# Patient Record
Sex: MALE | Race: WHITE | NOT HISPANIC OR LATINO | Employment: FULL TIME | URBAN - METROPOLITAN AREA
[De-identification: names, ages, dates, MRNs, and addresses within clinical notes are randomized per-mention and may not be internally consistent; named-entity substitution may affect disease eponyms.]

---

## 2019-06-20 DIAGNOSIS — L50.1 IDIOPATHIC URTICARIA: ICD-10-CM

## 2019-06-20 LAB
ALT SERPL W P-5'-P-CCNC: 14 U/L (ref 0–50)
BASOPHILS # BLD AUTO: 0 10E9/L (ref 0–0.2)
BASOPHILS NFR BLD AUTO: 0.3 %
DEPRECATED CALCIDIOL+CALCIFEROL SERPL-MC: 16 UG/L (ref 20–75)
DIFFERENTIAL METHOD BLD: NORMAL
EOSINOPHIL # BLD AUTO: 0.4 10E9/L (ref 0–0.7)
EOSINOPHIL NFR BLD AUTO: 6.2 %
ERYTHROCYTE [DISTWIDTH] IN BLOOD BY AUTOMATED COUNT: 13.2 % (ref 10–15)
HCT VFR BLD AUTO: 45 % (ref 40–53)
HGB BLD-MCNC: 15.1 G/DL (ref 13.3–17.7)
LYMPHOCYTES # BLD AUTO: 3 10E9/L (ref 0.8–5.3)
LYMPHOCYTES NFR BLD AUTO: 47.7 %
MCH RBC QN AUTO: 28.2 PG (ref 26.5–33)
MCHC RBC AUTO-ENTMCNC: 33.6 G/DL (ref 31.5–36.5)
MCV RBC AUTO: 84 FL (ref 78–100)
MONOCYTES # BLD AUTO: 0.5 10E9/L (ref 0–1.3)
MONOCYTES NFR BLD AUTO: 8 %
NEUTROPHILS # BLD AUTO: 2.4 10E9/L (ref 1.6–8.3)
NEUTROPHILS NFR BLD AUTO: 37.8 %
PLATELET # BLD AUTO: 277 10E9/L (ref 150–450)
RBC # BLD AUTO: 5.36 10E12/L (ref 4.4–5.9)
TSH SERPL DL<=0.005 MIU/L-ACNC: 6.92 MU/L (ref 0.4–4)
WBC # BLD AUTO: 6.3 10E9/L (ref 4–11)

## 2019-06-20 PROCEDURE — 86038 ANTINUCLEAR ANTIBODIES: CPT | Performed by: ALLERGY & IMMUNOLOGY

## 2019-06-20 PROCEDURE — 84443 ASSAY THYROID STIM HORMONE: CPT | Performed by: ALLERGY & IMMUNOLOGY

## 2019-06-20 PROCEDURE — 82306 VITAMIN D 25 HYDROXY: CPT | Performed by: ALLERGY & IMMUNOLOGY

## 2019-06-20 PROCEDURE — 84460 ALANINE AMINO (ALT) (SGPT): CPT | Performed by: ALLERGY & IMMUNOLOGY

## 2019-06-20 PROCEDURE — 85025 COMPLETE CBC W/AUTO DIFF WBC: CPT | Performed by: ALLERGY & IMMUNOLOGY

## 2019-06-20 PROCEDURE — 36415 COLL VENOUS BLD VENIPUNCTURE: CPT | Performed by: ALLERGY & IMMUNOLOGY

## 2019-06-21 LAB — ANA SER QL IF: NEGATIVE

## 2021-07-29 ENCOUNTER — OFFICE VISIT (OUTPATIENT)
Dept: URGENT CARE | Facility: URGENT CARE | Age: 21
End: 2021-07-29
Payer: COMMERCIAL

## 2021-07-29 VITALS
WEIGHT: 170 LBS | DIASTOLIC BLOOD PRESSURE: 82 MMHG | HEIGHT: 74 IN | OXYGEN SATURATION: 99 % | HEART RATE: 74 BPM | SYSTOLIC BLOOD PRESSURE: 129 MMHG | BODY MASS INDEX: 21.82 KG/M2 | TEMPERATURE: 97.8 F | RESPIRATION RATE: 18 BRPM

## 2021-07-29 DIAGNOSIS — J02.0 STREPTOCOCCAL PHARYNGITIS: ICD-10-CM

## 2021-07-29 DIAGNOSIS — R53.83 OTHER FATIGUE: ICD-10-CM

## 2021-07-29 DIAGNOSIS — Z20.822 SUSPECTED COVID-19 VIRUS INFECTION: Primary | ICD-10-CM

## 2021-07-29 DIAGNOSIS — J03.90 TONSILLITIS: ICD-10-CM

## 2021-07-29 LAB
BASOPHILS # BLD AUTO: 0 10E3/UL (ref 0–0.2)
BASOPHILS NFR BLD AUTO: 0 %
DEPRECATED S PYO AG THROAT QL EIA: POSITIVE
EOSINOPHIL # BLD AUTO: 0.6 10E3/UL (ref 0–0.7)
EOSINOPHIL NFR BLD AUTO: 12 %
LYMPHOCYTES # BLD AUTO: 1.7 10E3/UL (ref 0.8–5.3)
LYMPHOCYTES NFR BLD AUTO: 36 %
MONOCYTES # BLD AUTO: 0.4 10E3/UL (ref 0–1.3)
MONOCYTES NFR BLD AUTO: 9 %
MONOCYTES NFR BLD AUTO: NEGATIVE %
NEUTROPHILS # BLD AUTO: 2 10E3/UL (ref 1.6–8.3)
NEUTROPHILS NFR BLD AUTO: 43 %
SARS-COV-2 RNA RESP QL NAA+PROBE: NEGATIVE
WBC # BLD AUTO: 4.7 10E3/UL (ref 4–11)

## 2021-07-29 PROCEDURE — 86308 HETEROPHILE ANTIBODY SCREEN: CPT | Performed by: PHYSICIAN ASSISTANT

## 2021-07-29 PROCEDURE — U0003 INFECTIOUS AGENT DETECTION BY NUCLEIC ACID (DNA OR RNA); SEVERE ACUTE RESPIRATORY SYNDROME CORONAVIRUS 2 (SARS-COV-2) (CORONAVIRUS DISEASE [COVID-19]), AMPLIFIED PROBE TECHNIQUE, MAKING USE OF HIGH THROUGHPUT TECHNOLOGIES AS DESCRIBED BY CMS-2020-01-R: HCPCS | Performed by: PHYSICIAN ASSISTANT

## 2021-07-29 PROCEDURE — 85004 AUTOMATED DIFF WBC COUNT: CPT | Performed by: PHYSICIAN ASSISTANT

## 2021-07-29 PROCEDURE — 36415 COLL VENOUS BLD VENIPUNCTURE: CPT | Performed by: PHYSICIAN ASSISTANT

## 2021-07-29 PROCEDURE — 99204 OFFICE O/P NEW MOD 45 MIN: CPT | Performed by: PHYSICIAN ASSISTANT

## 2021-07-29 PROCEDURE — U0005 INFEC AGEN DETEC AMPLI PROBE: HCPCS | Performed by: PHYSICIAN ASSISTANT

## 2021-07-29 PROCEDURE — 85048 AUTOMATED LEUKOCYTE COUNT: CPT | Performed by: PHYSICIAN ASSISTANT

## 2021-07-29 RX ORDER — PENICILLIN V POTASSIUM 500 MG/1
500 TABLET, FILM COATED ORAL 2 TIMES DAILY
Qty: 20 TABLET | Refills: 0 | Status: SHIPPED | OUTPATIENT
Start: 2021-07-29 | End: 2021-08-08

## 2021-07-29 ASSESSMENT — MIFFLIN-ST. JEOR: SCORE: 1845.86

## 2021-07-29 NOTE — PATIENT INSTRUCTIONS
Follow up immediately with severe headache, chest pain, or shortness of breath    Rest, isolate for 10 days, hydrate, follow up if worsening or new symptoms  Household members to isolate until test results, if positive isolate for 2 weeks and follow up for testing if symptoms occur         Patient Education     Coronavirus Disease 2019 (COVID-19): Caring for Yourself or Others   If you or a household member have symptoms of COVID-19, follow the guidelines below. This will help you manage symptoms and keep the virus from spreading.  If you have symptoms of COVID-19    Stay home and contact your care team. They will tell you what to do.    Don t panic. Keep in mind that other illnesses can cause similar symptoms.    Stay away from work, school, and public places.    Limit physical contact with others in your home. Limit visitors. No kissing.  Clean surfaces you touch with disinfectant.  If you need to cough or sneeze, do it into a tissue. Then throw the tissue into the trash. If you don't have tissues, cough or sneeze into the bend of your elbow.  Don t share food or personal items with people in your household. This includes items like eating and drinking utensils, towels, and bedding.  Wear a cloth face mask around other people. During a public health emergency, medical face masks may be reserved for healthcare workers. You may need to make a cloth face mask of your own. You can do this using a bandana, T-shirt, or other cloth. The CDC has instructions on how to make a face mask. Wear the mask so that it covers both your nose and mouth.  If you need to go to a hospital or clinic, call ahead to let them know. Expect the care team to wear masks, gowns, gloves, and eye protection. You may need to come to a different entrance or wait in a separate room.  Follow all instructions from your care team.    If you ve been diagnosed with COVID-19    Stay home and away from others, including other people in your home. (This is  called self-isolation.) Don t leave home unless you need to get medical care. Don t go to work, school, or public places. Don t use buses, taxis, or other public transportation.    Follow all instructions from your care team.    If you need to go to a hospital or clinic, call ahead to let them know. Expect the care team to wear masks, gowns, gloves, and eye protection. You may need to come to a different entrance or wait in a separate room.    Wear a face mask over your nose and mouth. This is to protect others from your germs. If you can t wear a mask, your caregivers should wear one. You may need to make your own mask using a bandana, T-shirt, or other cloth. See the CDC s instructions on how to make a face mask.    Have no contact with pets and other animals.    Don t share food or personal items with people in your household. This includes items like eating and drinking utensils, towels, and bedding.    If you need to cough or sneeze, do it into a tissue. Then throw the tissue into the trash. If you don't have tissues, cough or sneeze into the bend of your elbow.    Wash your hands often.    Self-care at home   At this time, there is no medicine approved to prevent or treat COVID-19. The FDA has approved an antiviral medicine (called remdesivir) for people being treated in the hospital. This is for people 12 years and older who weigh more than about 88 pounds (40 kgs). In certain cases, it may also be used for people younger than 12 years or who weigh less than about 88 pounds (40 kgs)..  Currently, treatment is mostly aimed at helping your body while it fights the virus.    Getting extra rest.    Drink extra fluids 6 to 8 glasses of liquids each day), unless a doctor has told you not to. Ask your care team which fluids are best for you. Avoid fluids that contain caffeine or alcohol.    Taking over-the-counter (OTC) medicine to reduce pain and fever. Your care team will tell you which medicine to use.  If you ve  been in the hospital for COVID-19, follow your care team s instructions. They will tell you when to stop self-isolation. They may also have you change positions to help your breathing (such as lying on your belly).  If a test showed that you have COVID-19, you may be asked to donate plasma after you ve recovered. (This is called COVID-19 convalescent plasma donation.) The plasma may contain antibodies to help fight the virus in others. Experts don't know if the plasma will work well as a treatment. Research continues, and the FDA has approved it for emergency use in certain people with serious or life-threatening COVID-19. For more information, talk to your care team.  Caring for a sick person     Follow all instructions from the care team.    Wash your hands often.    Wear protective clothing as advised.    Make sure the sick person wears a mask. If they can't wear a mask, don t stay in the same room with them. If you must be in the same room, wear a face mask. Make sure the mask covers both the nose and mouth.    Keep track of the sick person s symptoms.    Clean surfaces often with disinfectant. This includes phones, kitchen counters, fridge door handles, bathroom surfaces, and others.    Don t let anyone share household items with the sick person. This includes eating and drinking tools, towels, sheets, or blankets.    Clean fabrics and laundry well.    Keep other people and pets away from the sick person.    When you can stop self-isolation  When you are sick with COVID-19, you should stay away from other people. This is called self-isolation. The rules for ending self-isolation depend on your health in general.  If you are normally healthy:  You can stop self-isolation when all 3 of these are true:    You ve had no fever--and no medicine that reduces fever--for at least 24 hours. And     Your symptoms are better, such as cough or trouble breathing. And     At least 10 days have passed since your symptoms first  started.  Talk with your care team before you leave home. They may tell you it s okay to leave, or they may give you different advice. You do not need to be re-tested.  If you have a weak immune system, or you ve had severe COVID-19:  Follow your care team s instructions. You may be asked to self-isolate for 10 days to 20 days after your symptoms first started. Your care team may want to re-test you for COVID-19.  Note: If you re being treated for cancer, have an immune disorder (such as HIV), or have had a transplant (organ or bone marrow), you may have a weak immune system.  If you've already had COVID-19 once:  If you had the virus over 3 months ago, and you ve been exposed again, treat it like you've never had COVID-19. Stay home, limit your contact with others, call your care team, and watch for symptoms.  If it s been less than 3 months since you had the virus, you re symptom-free, and you ve been exposed again: You don t need to self-isolate or be re-tested. But if you develop new symptoms that can t be linked to another illness, please self-isolate and contact your care team.  When you return to public settings  When you are well enough to go outside your home, follow the CDC s guidance on cloth face masks.    Anyone over age 2 should wear a face mask in public, especially when it's hard to socially distance. This includes public transit, public protests and marches, and crowded stores, bars, and restaurants.    Face masks are most likely to reduce the spread of COVID-19 when they are widely used by people who are out in the public.  Certain people should not wear a face covering. These include:    Children under 2 years old    Anyone with a health, developmental, or mental health condition that can be made worse by wearing a mask    Anyone who is unconscious or unable to remove the face covering without help. See the CDC's guidance on who should not wear a face mask.  When to call your care team  Call your  care team right away if a sick person has any of these:    Trouble breathing    Pain or pressure in chest  If a sick person has any of these, call 911:    Trouble breathing that gets worse    Pain or pressure in chest that gets worse    Blue tint to lips or face    Fast or irregular heartbeat    Confusion or trouble waking    Fainting or loss of consciousness    Coughing up blood  Going home from the hospital   If you have COVID-19 and were recently in the hospital:    Follow the instructions above for self-care and isolation.    Follow the hospital care team s instructions.    Ask questions if anything is unclear to you. Write down answers so you remember them.  Date last modified: 11/23/2020  [employer] last reviewed this educational content on 4/1/2020  This information has been modified by your health care provider with permission from the publisher.    2236-9293 [employer]. [address] [place] [id number]. All rights reserved. This information is not intended as a substitute for professional medical care. Always follow your healthcare professional's instructions.             Patient Education     Pharyngitis: Strep (Confirmed)    You have had a positive test for strep throat. Strep throat is a contagious illness. It's spread by coughing, kissing, sharing glasses or eating utensils, or by touching others after touching your mouth or nose. Symptoms include throat pain that is worse with swallowing, aching all over, headache, swollen lymph nodes at the front of the neck, and red swollen tonsils sometimes with white patches and fever. It's treated with antibiotic medicine. This should help you start to feel better in 1 to 2 days.   Home care    Rest at home. Drink plenty of fluids so you won't get dehydrated.    No work or school for the first 2 days of taking the antibiotics. You can then return to school or work if you are feeling better, have been taking the antibiotic for at least 24  hours and don't have a fever.     Take antibiotic medicine for the full 10 days, even if you feel better. This is very important to ensure the infection is treated completely. It's also important to prevent medicine-resistant germs from developing. If you were given an antibiotic shot, you don't need any more antibiotics.    You may use acetaminophen or ibuprofen to control pain or fever, unless another medicine was prescribed for this. Talk with your healthcare provider before taking these medicines if you have chronic liver or kidney disease or if you have had a stomach ulcer or gastrointestinal bleeding.    Throat lozenges or sprays help reduce pain. Gargling with warm saltwater will also reduce throat pain. Dissolve 1/2 teaspoon of salt in 1 glass of warm water. This may be useful just before meals.     Soft foods and cool or warm fluids are best. Don't eat salty or spicy foods.    Follow-up care  Follow up with your healthcare provider or our staff if you don't get better over the next week.   When to get medical advice  Call your healthcare provider right away or get immediate medical care if any of these occur:     Fever of 100.4 F (38 C) or higher, or as directed by your healthcare provider    New or worsening ear pain, sinus pain, or headache    Painful lumps in the back of neck    Stiff neck    Lymph nodes getting larger or becoming soft in the middle    You have trouble swallowing liquids or you can't open your mouth wide because of throat pain    Signs of dehydration. These include very dark urine or no urine, sunken eyes, and dizziness.    Noisy breathing    Muffled voice    Rash  Call 911  Call 911right away if you:     Have trouble breathing    Can't swallow or talk    Prevention  Here are steps you can take to help prevent an infection:     Wash your hands often with soap and clean, running water for at least 20 seconds.    Don t have close contact with people who have sore throats, colds, or other  upper respiratory infections.    Don t smoke, and stay away from secondhand smoke.  [employer] last reviewed this educational content on 3/1/2020    8093-2868 [employer]. All rights reserved. This information is not intended as a substitute for professional medical care. Always follow your healthcare professional's instructions.

## 2021-07-29 NOTE — PROGRESS NOTES
"SUBJECTIVE:   Figueroa Del Cid is a 20 year old male presenting with a chief complaint of modest fatigue for 2 weeks.  Swollen tonsils.  No sore throat, no fever.        No past medical history on file.  No current outpatient medications on file.     Social History     Tobacco Use     Smoking status: Never Smoker     Smokeless tobacco: Never Used   Substance Use Topics     Alcohol use: Not on file       ROS:  10 point ROS negative except as listed above      OBJECTIVE:  /82   Pulse 74   Temp 97.8  F (36.6  C)   Resp 18   Ht 1.88 m (6' 2\")   Wt 77.1 kg (170 lb)   SpO2 99%   BMI 21.83 kg/m    GENERAL APPEARANCE: healthy, alert and no distress  EYES: EOMI,  PERRL, conjunctiva clear  HENT: ear canals and TM's normal.  Nose and mouth without ulcers, erythema or lesions  NECK: supple, nontender, no lymphadenopathy  RESP: lungs clear to auscultation - no rales, rhonchi or wheezes  CV: regular rates and rhythm, normal S1 S2, no murmur noted  ABDOMEN:  soft, nontender, no HSM or masses and bowel sounds normal  NEURO: Normal strength and tone, sensory exam grossly normal,  normal speech and mentation  SKIN: no suspicious lesions or rashes  Results for orders placed or performed in visit on 07/29/21   SARS-COV2 (COVID-19) Virus RT-PCR     Status: Normal    Specimen: Nasopharyngeal; Swab   Result Value Ref Range    SARS CoV2 PCR Negative Negative    Narrative    Testing was performed using the Aptima SARS-CoV-2 Assay on the  [employer] Instrument System. Additional information about this  Emergency Use Authorization (EUA) assay can be found via the Lab  Guide. This test should be ordered for the detection of SARS-CoV-2 in  individuals who meet SARS-CoV-2 clinical and/or epidemiological  criteria. Test performance is unknown in asymptomatic patients. This  test is for in vitro diagnostic use under the FDA EUA for  laboratories certified under CLIA to perform high complexity testing.  This test has not been FDA " cleared or approved. A negative result  does not rule out the presence of PCR inhibitors in the specimen or  target RNA in concentration below the limit of detection for the  assay. The possibility of a false negative should be considered if  the patient's recent exposure or clinical presentation suggests  COVID-19. This test was validated by the [facility]. This laboratory is certified under  the Clinical Laboratory Improvement Amendments of 1988 (CLIA-88) as  qualified to perform high complexity laboratory testing.   Mononucleosis screen     Status: Normal   Result Value Ref Range    Mononucleosis Screen Negative Negative   WBC and Differential     Status: None   Result Value Ref Range    WBC Count 4.7 4.0 - 11.0 10e3/uL    % Neutrophils 43 %    % Lymphocytes 36 %    % Monocytes 9 %    % Eosinophils 12 %    % Basophils 0 %    Absolute Neutrophils 2.0 1.6 - 8.3 10e3/uL    Absolute Lymphocytes 1.7 0.8 - 5.3 10e3/uL    Absolute Monocytes 0.4 0.0 - 1.3 10e3/uL    Absolute Eosinophils 0.6 0.0 - 0.7 10e3/uL    Absolute Basophils 0.0 0.0 - 0.2 10e3/uL   Asymptomatic COVID-19 Virus (Coronavirus) by PCR Nasopharyngeal     Status: Normal    Specimen: Nasopharyngeal; Swab    Narrative    The following orders were created for panel order Asymptomatic COVID-19 Virus (Coronavirus) by PCR Nasopharyngeal.  Procedure                               Abnormality         Status                     ---------                               -----------         ------                     SARS-COV2 (COVID-19) Vir...[[id number]]  Normal              Final result                 Please view results for these tests on the individual orders.   Streptococcus A Rapid Screen w/Reflex to PCR - Clinic Collect     Status: Abnormal    Specimen: Throat; Swab   Result Value Ref Range    Group A Strep antigen Positive (A) Negative   WBC with Diff     Status: None    Narrative    The following orders were  created for panel order WBC with Diff.  Procedure                               Abnormality         Status                     ---------                               -----------         ------                     WBC and Differential[[id number]]                             Final result                 Please view results for these tests on the individual orders.       ASSESSMENT:  (Z20.822) Suspected COVID-19 virus infection  (primary encounter diagnosis)  Plan: Asymptomatic COVID-19 Virus (Coronavirus) by         PCR Nasopharyngeal, WBC with Diff      (R53.83) Other fatigue  Plan: Mononucleosis screen      (J03.90) Tonsillitis  Plan: Mononucleosis screen, WBC with Diff,         Streptococcus A Rapid Screen w/Reflex to PCR -         Clinic Collect       (J02.0) Streptococcal pharyngitis  Plan: penicillin V (VEETID) 500 MG tablet        Covid-19  Pt was evaluated during a global COVID-19 pandemic, which necessitated consideration that the patient might be at risk for infection with the SARS-CoV-2 virus that causes COVID-19.   Applicable protocols for evaluation were followed during the patient's care.   COVID-19 was considered as part of the patient's evaluation. The plan for testing is:  a test was ordered during this visit.    No severe headache, chest pain, shortness of breath  No additional infectious symptoms  Rest, isolate for 10 days, hydrate, test, follow up if worsening or new symptoms  HH members to isolate until test results, if positive isolate for 2 weeks and follow up for testing if symptoms occur  Red flags and emergent follow up discussed, and understood by patient  Follow up with PCP if symptoms worsen or fail to improve    Surgical mask, gown, shield, hairnet, gloves worn by provider  Patient Instructions   Follow up immediately with severe headache, chest pain, or shortness of breath    Rest, isolate for 10 days, hydrate, follow up if worsening or new symptoms  Household members to isolate until  test results, if positive isolate for 2 weeks and follow up for testing if symptoms occur         Patient Education     Coronavirus Disease 2019 (COVID-19): Caring for Yourself or Others   If you or a household member have symptoms of COVID-19, follow the guidelines below. This will help you manage symptoms and keep the virus from spreading.  If you have symptoms of COVID-19    Stay home and contact your care team. They will tell you what to do.    Don t panic. Keep in mind that other illnesses can cause similar symptoms.    Stay away from work, school, and public places.    Limit physical contact with others in your home. Limit visitors. No kissing.  Clean surfaces you touch with disinfectant.  If you need to cough or sneeze, do it into a tissue. Then throw the tissue into the trash. If you don't have tissues, cough or sneeze into the bend of your elbow.  Don t share food or personal items with people in your household. This includes items like eating and drinking utensils, towels, and bedding.  Wear a cloth face mask around other people. During a public health emergency, medical face masks may be reserved for healthcare workers. You may need to make a cloth face mask of your own. You can do this using a bandana, T-shirt, or other cloth. The CDC has instructions on how to make a face mask. Wear the mask so that it covers both your nose and mouth.  If you need to go to a hospital or clinic, call ahead to let them know. Expect the care team to wear masks, gowns, gloves, and eye protection. You may need to come to a different entrance or wait in a separate room.  Follow all instructions from your care team.    If you ve been diagnosed with COVID-19    Stay home and away from others, including other people in your home. (This is called self-isolation.) Don t leave home unless you need to get medical care. Don t go to work, school, or public places. Don t use buses, taxis, or other public transportation.    Follow all  instructions from your care team.    If you need to go to a hospital or clinic, call ahead to let them know. Expect the care team to wear masks, gowns, gloves, and eye protection. You may need to come to a different entrance or wait in a separate room.    Wear a face mask over your nose and mouth. This is to protect others from your germs. If you can t wear a mask, your caregivers should wear one. You may need to make your own mask using a bandana, T-shirt, or other cloth. See the CDC s instructions on how to make a face mask.    Have no contact with pets and other animals.    Don t share food or personal items with people in your household. This includes items like eating and drinking utensils, towels, and bedding.    If you need to cough or sneeze, do it into a tissue. Then throw the tissue into the trash. If you don't have tissues, cough or sneeze into the bend of your elbow.    Wash your hands often.    Self-care at home   At this time, there is no medicine approved to prevent or treat COVID-19. The FDA has approved an antiviral medicine (called remdesivir) for people being treated in the hospital. This is for people 12 years and older who weigh more than about 88 pounds (40 kgs). In certain cases, it may also be used for people younger than 12 years or who weigh less than about 88 pounds (40 kgs)..  Currently, treatment is mostly aimed at helping your body while it fights the virus.    Getting extra rest.    Drink extra fluids 6 to 8 glasses of liquids each day), unless a doctor has told you not to. Ask your care team which fluids are best for you. Avoid fluids that contain caffeine or alcohol.    Taking over-the-counter (OTC) medicine to reduce pain and fever. Your care team will tell you which medicine to use.  If you ve been in the hospital for COVID-19, follow your care team s instructions. They will tell you when to stop self-isolation. They may also have you change positions to help your breathing (such  as lying on your belly).  If a test showed that you have COVID-19, you may be asked to donate plasma after you ve recovered. (This is called COVID-19 convalescent plasma donation.) The plasma may contain antibodies to help fight the virus in others. Experts don't know if the plasma will work well as a treatment. Research continues, and the FDA has approved it for emergency use in certain people with serious or life-threatening COVID-19. For more information, talk to your care team.  Caring for a sick person     Follow all instructions from the care team.    Wash your hands often.    Wear protective clothing as advised.    Make sure the sick person wears a mask. If they can't wear a mask, don t stay in the same room with them. If you must be in the same room, wear a face mask. Make sure the mask covers both the nose and mouth.    Keep track of the sick person s symptoms.    Clean surfaces often with disinfectant. This includes phones, kitchen counters, fridge door handles, bathroom surfaces, and others.    Don t let anyone share household items with the sick person. This includes eating and drinking tools, towels, sheets, or blankets.    Clean fabrics and laundry well.    Keep other people and pets away from the sick person.    When you can stop self-isolation  When you are sick with COVID-19, you should stay away from other people. This is called self-isolation. The rules for ending self-isolation depend on your health in general.  If you are normally healthy:  You can stop self-isolation when all 3 of these are true:    You ve had no fever--and no medicine that reduces fever--for at least 24 hours. And     Your symptoms are better, such as cough or trouble breathing. And     At least 10 days have passed since your symptoms first started.  Talk with your care team before you leave home. They may tell you it s okay to leave, or they may give you different advice. You do not need to be re-tested.  If you have a weak  immune system, or you ve had severe COVID-19:  Follow your care team s instructions. You may be asked to self-isolate for 10 days to 20 days after your symptoms first started. Your care team may want to re-test you for COVID-19.  Note: If you re being treated for cancer, have an immune disorder (such as HIV), or have had a transplant (organ or bone marrow), you may have a weak immune system.  If you've already had COVID-19 once:  If you had the virus over 3 months ago, and you ve been exposed again, treat it like you've never had COVID-19. Stay home, limit your contact with others, call your care team, and watch for symptoms.  If it s been less than 3 months since you had the virus, you re symptom-free, and you ve been exposed again: You don t need to self-isolate or be re-tested. But if you develop new symptoms that can t be linked to another illness, please self-isolate and contact your care team.  When you return to public settings  When you are well enough to go outside your home, follow the CDC s guidance on cloth face masks.    Anyone over age 2 should wear a face mask in public, especially when it's hard to socially distance. This includes public transit, public protests and marches, and crowded stores, bars, and restaurants.    Face masks are most likely to reduce the spread of COVID-19 when they are widely used by people who are out in the public.  Certain people should not wear a face covering. These include:    Children under 2 years old    Anyone with a health, developmental, or mental health condition that can be made worse by wearing a mask    Anyone who is unconscious or unable to remove the face covering without help. See the CDC's guidance on who should not wear a face mask.  When to call your care team  Call your care team right away if a sick person has any of these:    Trouble breathing    Pain or pressure in chest  If a sick person has any of these, call 911:    Trouble breathing that gets  worse    Pain or pressure in chest that gets worse    Blue tint to lips or face    Fast or irregular heartbeat    Confusion or trouble waking    Fainting or loss of consciousness    Coughing up blood  Going home from the hospital   If you have COVID-19 and were recently in the hospital:    Follow the instructions above for self-care and isolation.    Follow the hospital care team s instructions.    Ask questions if anything is unclear to you. Write down answers so you remember them.  Date last modified: 11/23/2020  [employer] last reviewed this educational content on 4/1/2020  This information has been modified by your health care provider with permission from the publisher.    6729-7880 [employer]. [address] [place] [address], [place] [address]. All rights reserved. This information is not intended as a substitute for professional medical care. Always follow your healthcare professional's instructions.             Patient Education     Pharyngitis: Strep (Confirmed)    You have had a positive test for strep throat. Strep throat is a contagious illness. It's spread by coughing, kissing, sharing glasses or eating utensils, or by touching others after touching your mouth or nose. Symptoms include throat pain that is worse with swallowing, aching all over, headache, swollen lymph nodes at the front of the neck, and red swollen tonsils sometimes with white patches and fever. It's treated with antibiotic medicine. This should help you start to feel better in 1 to 2 days.   Home care    Rest at home. Drink plenty of fluids so you won't get dehydrated.    No work or school for the first 2 days of taking the antibiotics. You can then return to school or work if you are feeling better, have been taking the antibiotic for at least 24 hours and don't have a fever.     Take antibiotic medicine for the full 10 days, even if you feel better. This is very important to ensure the infection is treated completely. It's also  important to prevent medicine-resistant germs from developing. If you were given an antibiotic shot, you don't need any more antibiotics.    You may use acetaminophen or ibuprofen to control pain or fever, unless another medicine was prescribed for this. Talk with your healthcare provider before taking these medicines if you have chronic liver or kidney disease or if you have had a stomach ulcer or gastrointestinal bleeding.    Throat lozenges or sprays help reduce pain. Gargling with warm saltwater will also reduce throat pain. Dissolve 1/2 teaspoon of salt in 1 glass of warm water. This may be useful just before meals.     Soft foods and cool or warm fluids are best. Don't eat salty or spicy foods.    Follow-up care  Follow up with your healthcare provider or our staff if you don't get better over the next week.   When to get medical advice  Call your healthcare provider right away or get immediate medical care if any of these occur:     Fever of 100.4 F (38 C) or higher, or as directed by your healthcare provider    New or worsening ear pain, sinus pain, or headache    Painful lumps in the back of neck    Stiff neck    Lymph nodes getting larger or becoming soft in the middle    You have trouble swallowing liquids or you can't open your mouth wide because of throat pain    Signs of dehydration. These include very dark urine or no urine, sunken eyes, and dizziness.    Noisy breathing    Muffled voice    Rash  Call 911  Call 911right away if you:     Have trouble breathing    Can't swallow or talk    Prevention  Here are steps you can take to help prevent an infection:     Wash your hands often with soap and clean, running water for at least 20 seconds.    Don t have close contact with people who have sore throats, colds, or other upper respiratory infections.    Don t smoke, and stay away from secondhand smoke.  [employer] last reviewed this educational content on 3/1/2020    6318-8460 [employer]. All  rights reserved. This information is not intended as a substitute for professional medical care. Always follow your healthcare professional's instructions.

## 2021-07-29 NOTE — LETTER
[facility] URGENT [facility]  [address] [address]  Phone: [phone]    July 29, 2021        Figueroa Del Cid  [address] [address]          To whom it may concern:    RE: Figueroa Del Cid    Patient was seen and treated today at our clinic and missed work.  Please excuse absence on 7/30/21.    Please contact me for questions or concerns.      Sincerely,        Garry Vásquez PA-C

## 2024-04-16 ENCOUNTER — HOSPITAL ENCOUNTER (EMERGENCY)
Facility: CLINIC | Age: 24
Discharge: HOME | End: 2024-04-16
Attending: EMERGENCY MEDICINE | Admitting: EMERGENCY MEDICINE
Payer: COMMERCIAL

## 2024-04-16 VITALS
RESPIRATION RATE: 18 BRPM | DIASTOLIC BLOOD PRESSURE: 98 MMHG | WEIGHT: 170 LBS | HEART RATE: 104 BPM | SYSTOLIC BLOOD PRESSURE: 151 MMHG | HEIGHT: 74 IN | OXYGEN SATURATION: 98 % | BODY MASS INDEX: 21.82 KG/M2 | TEMPERATURE: 98.8 F

## 2024-04-16 DIAGNOSIS — K92.1 BLACK STOOLS: ICD-10-CM

## 2024-04-16 DIAGNOSIS — T88.7XXA MEDICATION SIDE EFFECTS: ICD-10-CM

## 2024-04-16 LAB
ABO/RH(D): NORMAL
ALBUMIN SERPL BCG-MCNC: 4.8 G/DL (ref 3.5–5.2)
ALP SERPL-CCNC: 52 U/L (ref 40–150)
ALT SERPL W P-5'-P-CCNC: 16 U/L (ref 0–70)
ANION GAP SERPL CALCULATED.3IONS-SCNC: 14 MMOL/L (ref 7–15)
ANTIBODY SCREEN: NEGATIVE
AST SERPL W P-5'-P-CCNC: 18 U/L (ref 0–45)
BASOPHILS # BLD AUTO: 0.1 10E3/UL (ref 0–0.2)
BASOPHILS NFR BLD AUTO: 1 %
BILIRUB SERPL-MCNC: 0.3 MG/DL
BUN SERPL-MCNC: 8.8 MG/DL (ref 6–20)
CALCIUM SERPL-MCNC: 8.9 MG/DL (ref 8.6–10)
CHLORIDE SERPL-SCNC: 104 MMOL/L (ref 98–107)
CREAT SERPL-MCNC: 0.97 MG/DL (ref 0.67–1.17)
DEPRECATED HCO3 PLAS-SCNC: 22 MMOL/L (ref 22–29)
EGFRCR SERPLBLD CKD-EPI 2021: >90 ML/MIN/1.73M2
EOSINOPHIL # BLD AUTO: 0.6 10E3/UL (ref 0–0.7)
EOSINOPHIL NFR BLD AUTO: 12 %
ERYTHROCYTE [DISTWIDTH] IN BLOOD BY AUTOMATED COUNT: 12.7 % (ref 10–15)
GLUCOSE SERPL-MCNC: 80 MG/DL (ref 70–99)
HCT VFR BLD AUTO: 43.4 % (ref 40–53)
HEMOCCULT STL QL: NEGATIVE
HGB BLD-MCNC: 14.6 G/DL (ref 13.3–17.7)
IMM GRANULOCYTES # BLD: 0 10E3/UL
IMM GRANULOCYTES NFR BLD: 0 %
LYMPHOCYTES # BLD AUTO: 1.7 10E3/UL (ref 0.8–5.3)
LYMPHOCYTES NFR BLD AUTO: 31 %
MCH RBC QN AUTO: 28 PG (ref 26.5–33)
MCHC RBC AUTO-ENTMCNC: 33.6 G/DL (ref 31.5–36.5)
MCV RBC AUTO: 83 FL (ref 78–100)
MONOCYTES # BLD AUTO: 0.4 10E3/UL (ref 0–1.3)
MONOCYTES NFR BLD AUTO: 8 %
NEUTROPHILS # BLD AUTO: 2.7 10E3/UL (ref 1.6–8.3)
NEUTROPHILS NFR BLD AUTO: 48 %
NRBC # BLD AUTO: 0 10E3/UL
NRBC BLD AUTO-RTO: 0 /100
PLATELET # BLD AUTO: 256 10E3/UL (ref 150–450)
POTASSIUM SERPL-SCNC: 3.4 MMOL/L (ref 3.4–5.3)
PROT SERPL-MCNC: 6.9 G/DL (ref 6.4–8.3)
RBC # BLD AUTO: 5.22 10E6/UL (ref 4.4–5.9)
SODIUM SERPL-SCNC: 140 MMOL/L (ref 135–145)
SPECIMEN EXPIRATION DATE: NORMAL
WBC # BLD AUTO: 5.4 10E3/UL (ref 4–11)

## 2024-04-16 PROCEDURE — 86900 BLOOD TYPING SEROLOGIC ABO: CPT | Performed by: EMERGENCY MEDICINE

## 2024-04-16 PROCEDURE — 82040 ASSAY OF SERUM ALBUMIN: CPT | Performed by: EMERGENCY MEDICINE

## 2024-04-16 PROCEDURE — 82272 OCCULT BLD FECES 1-3 TESTS: CPT | Performed by: EMERGENCY MEDICINE

## 2024-04-16 PROCEDURE — 85025 COMPLETE CBC W/AUTO DIFF WBC: CPT | Performed by: EMERGENCY MEDICINE

## 2024-04-16 PROCEDURE — 99283 EMERGENCY DEPT VISIT LOW MDM: CPT

## 2024-04-16 PROCEDURE — 36415 COLL VENOUS BLD VENIPUNCTURE: CPT | Performed by: EMERGENCY MEDICINE

## 2024-04-16 ASSESSMENT — COLUMBIA-SUICIDE SEVERITY RATING SCALE - C-SSRS
1. IN THE PAST MONTH, HAVE YOU WISHED YOU WERE DEAD OR WISHED YOU COULD GO TO SLEEP AND NOT WAKE UP?: NO
2. HAVE YOU ACTUALLY HAD ANY THOUGHTS OF KILLING YOURSELF IN THE PAST MONTH?: NO
6. HAVE YOU EVER DONE ANYTHING, STARTED TO DO ANYTHING, OR PREPARED TO DO ANYTHING TO END YOUR LIFE?: NO

## 2024-04-16 ASSESSMENT — ACTIVITIES OF DAILY LIVING (ADL): ADLS_ACUITY_SCORE: 35

## 2024-04-16 NOTE — ED TRIAGE NOTES
Pt reports he has been having dark tarry stool since Sunday. Pt reports he has impaction on Sunday, which he had strained to pass. Pt reports he also took pepto on Sunday.      Triage Assessment (Adult)       Row Name 04/16/24 1303          Triage Assessment    Airway WDL WDL        Respiratory WDL    Respiratory WDL WDL        Peripheral/Neurovascular WDL    Peripheral Neurovascular WDL WDL

## 2024-04-16 NOTE — DISCHARGE INSTRUCTIONS
Your black stools were likely due to the Pepto-Bismol.  Your stool occult test did not show any blood in the stool.  Your blood counts appear normal.  Follow-up with your primary care doctor as needed.  The black stool should resolve over the next 1 week.  If you continue using any Pepto-Bismol they may continue or persistently be black for a few additional days.

## 2024-04-16 NOTE — ED PROVIDER NOTES
"History   Chief Complaint:  Melena     HPI   Figueroa Del Cid is a 23 year old male who presents to the ED alone for evaluation of melena. Patient presents with melena for the past couple of days. He reports he took Peptobismol Sunday for an impaction in his lower esophagus. Denies abdominal pain, rectal pain, vomiting, chest pain, shortness of breath, weakness, or lightheadedness. He notes a previous Esophagogastroduodenoscopy but no colonoscopy.     Independent Historian:   None - Patient Only    Review of External Notes:   I reviewed Dr. Merrill's 1/9/24 Gastroenterology note discussing diagnostic Esophagogastroduodenoscopy.        Medications:    Wellbutrin   Cetirizine   Dicyclomine   Pepcid   Methylcellulose   Zoloft   Omeprazole     Past Medical History:    MDD    Past Surgical History:    Esophagogastroduodenoscopy x2   Gastroenterology procedure under anesthesia     Physical Exam   Patient Vitals for the past 24 hrs:   BP Temp Temp src Pulse Resp SpO2 Height Weight   04/16/24 1304 -- 98.8  F (37.1  C) Temporal -- -- -- -- --   04/16/24 1302 (!) 151/98 -- Temporal 104 18 98 % 1.88 m (6' 2\") 77.1 kg (170 lb)      Physical Exam  General: Alert, appears well-developed and well-nourished. Cooperative.     In no acute distress  HEENT:  Head:  Atraumatic  Ears:  External ears are normal  Mouth/Throat:  Oropharynx is without erythema or exudate and mucous membranes are moist.   Eyes:   Conjunctivae normal and EOM are normal. No scleral icterus.  CV:  Normal rate, regular rhythm, normal heart sounds and radial pulses are 2+ and symmetric.  No murmur.  Resp:  Breath sounds are clear bilaterally    Non-labored, no retractions or accessory muscle use  GI:  Abdomen is soft, no distension, no tenderness. No rebound or guarding.  No CVA tenderness bilaterally  Rectal: No hemorrhoids.  No anal fissure.  Black stool.    MS:  Normal range of motion. No edema.    Normal strength in all 4 extremities.     Back " atraumatic.    No midline cervical, thoracic, or lumbar tenderness  Skin:  Warm and dry.  No rash or lesions noted.  Neuro:   Alert. Normal strength.  GCS: 15  Psych: Normal mood and affect.    Emergency Department Course     Laboratory:  Labs Ordered and Resulted from Time of ED Arrival to Time of ED Departure   OCCULT BLOOD STOOL - Normal       Result Value    Occult Blood Negative     CBC WITH PLATELETS AND DIFFERENTIAL    WBC Count 5.4      RBC Count 5.22      Hemoglobin 14.6      Hematocrit 43.4      MCV 83      MCH 28.0      MCHC 33.6      RDW 12.7      Platelet Count 256      % Neutrophils 48      % Lymphocytes 31      % Monocytes 8      % Eosinophils 12      % Basophils 1      % Immature Granulocytes 0      NRBCs per 100 WBC 0      Absolute Neutrophils 2.7      Absolute Lymphocytes 1.7      Absolute Monocytes 0.4      Absolute Eosinophils 0.6      Absolute Basophils 0.1      Absolute Immature Granulocytes 0.0      Absolute NRBCs 0.0     TYPE AND SCREEN, ADULT    ABO/RH(D) O POS      Antibody Screen Negative      SPECIMEN EXPIRATION DATE [id number]          Procedures   None     Emergency Department Course & Assessments:    Interventions:  Medications - No data to display     Assessments:  1317 I obtained patient history and performed a physical exam.     Independent Interpretation (X-rays, CTs, rhythm strip):  None    Consultations/Discussion of Management or Tests:    ED Course as of 04/16/24 1937 Tue Apr 16, 2024   1302 I obtained patient history and performed a physical exam.        Social Determinants of Health affecting care:   None    Disposition:  The patient was discharged.     Impression & Plan    CMS Diagnoses: None     Medical Decision Making:  Patient is a 23-year-old male who presents with black stools for last 2 days.  Suspect black stools secondary to Pepto-Bismol use and medication side effect.  CBC is normal with no anemia.  Stool occult is negative even with black stool.  Patient  counseled that black stools will resolve over the next several days if no additional Pepto-Bismol is used.  Return precautions understood and follow-up with primary care as needed.  Discharged home after all questions answered.      Diagnosis:    ICD-10-CM    1. Black stools  K92.1       2. Medication side effects  T88.7XXA            Discharge Medications:  There are no discharge medications for this patient.         Scribe Disclosure:  I, Kely Dorado, am serving as a scribe at 1:18 PM on 4/16/2024 to document services personally performed by Hernesto Godinez MD based on my observations and the provider's statements to me.   4/16/2024   Hernesto Godinez MD White, Scott, MD  04/16/24 1937

## 2024-05-28 ENCOUNTER — OFFICE VISIT (OUTPATIENT)
Dept: INTERNAL MEDICINE | Facility: CLINIC | Age: 24
End: 2024-05-28
Payer: COMMERCIAL

## 2024-05-28 ENCOUNTER — TELEPHONE (OUTPATIENT)
Dept: INTERNAL MEDICINE | Facility: CLINIC | Age: 24
End: 2024-05-28

## 2024-05-28 VITALS
DIASTOLIC BLOOD PRESSURE: 82 MMHG | BODY MASS INDEX: 24.28 KG/M2 | HEART RATE: 82 BPM | OXYGEN SATURATION: 99 % | SYSTOLIC BLOOD PRESSURE: 128 MMHG | TEMPERATURE: 97.7 F | WEIGHT: 189.1 LBS

## 2024-05-28 DIAGNOSIS — R41.840 DIFFICULTY CONCENTRATING: Primary | ICD-10-CM

## 2024-05-28 DIAGNOSIS — F32.1 CURRENT MODERATE EPISODE OF MAJOR DEPRESSIVE DISORDER WITHOUT PRIOR EPISODE (H): ICD-10-CM

## 2024-05-28 DIAGNOSIS — K20.0 EOSINOPHILIC ESOPHAGITIS: ICD-10-CM

## 2024-05-28 DIAGNOSIS — T78.40XS ALLERGIC REACTION, SEQUELA: ICD-10-CM

## 2024-05-28 PROCEDURE — 99204 OFFICE O/P NEW MOD 45 MIN: CPT | Performed by: INTERNAL MEDICINE

## 2024-05-28 RX ORDER — BUPROPION HYDROCHLORIDE 300 MG/1
300 TABLET ORAL DAILY
COMMUNITY

## 2024-05-28 RX ORDER — CETIRIZINE HYDROCHLORIDE 10 MG/1
10 TABLET ORAL DAILY
COMMUNITY

## 2024-05-28 RX ORDER — TRIAMCINOLONE ACETONIDE 1 MG/G
OINTMENT TOPICAL
COMMUNITY
Start: 2023-06-15 | End: 2024-05-28

## 2024-05-28 RX ORDER — SERTRALINE HYDROCHLORIDE 100 MG/1
1 TABLET, FILM COATED ORAL DAILY
COMMUNITY
Start: 2024-03-16

## 2024-05-28 NOTE — PROGRESS NOTES
Assessment & Plan   Difficulty concentrating  Discussed with Rangel that I would not prescribe a stimulant medication without a formal diagnosis of ADHD. It sounds like that was the same thing his current psychiatrist at [facility] told him as well. I offered a referral to our psych department for a second opinion and he accepted that referral.  - Adult Mental Health  Referral; Future    Current moderate episode of major depressive disorder without prior episode (H)  Defer to his psych team at [facility].    Allergic reaction, sequela  Eosinophilic esophagitis  Per Oct 2023 GI note, they recommended allergy referral given new EOE diagnosis. He was agreeable. Referral placed. Refilled PPI per request.  - Adult Allergy/Asthma  Referral; Future  - omeprazole (PRILOSEC) 20 MG DR capsule; Take 1 capsule (20 mg) by mouth daily    Signed Electronically by:  Farhan Fu MD, MPH  [facility]  Internal Medicine    Subjective   Rangel is a 23 year old who presents to discuss ADHD symptoms. This is the first time I have met Rangel. Reports he recently moved from [place] and is hoping to establish care. He's interested in trying a stimulant medication. He underwent formal neuropsych testing which did not diagnose him with ADHD (he shows me this formal report on his phone today which indeed states he does not meet criteria for ADHD). He reports he has executive function issues and believes a stimulant would help this. He is established with [facility], and after some discussion he reports his psychiatrist has declined to prescribe him a stimulant since he doesn't meet ADHD criteria. He also reports ongoing 'overactive histamine response'. He reports he saw an allergist ~4 years ago who did formal allergy testing. He was diagnosed with EOE last year. He's on a PPI for that and is requesting refill.        Objective    /82   Pulse 82   Temp 97.7  F (36.5  C) (Temporal)   Wt  85.8 kg (189 lb 1.6 oz)   SpO2 99%   BMI 24.28 kg/m    Body mass index is 24.28 kg/m .    Physical Exam   GENERAL: alert and in no distress.  EYES: conjunctivae/corneas clear. EOMs grossly intact  HENT: Facies symmetric.  RESP: No iWOB.  MSK: Moves all four extremities freely  SKIN: No significant ulcers, lesions, or rashes on the visualized portions of the skin  NEURO: CN II-XII grossly intact.

## 2024-05-28 NOTE — TELEPHONE ENCOUNTER
Thank You,    Karla Foster, [facility] ] Department   [facility]  [email or web address]

## 2024-06-06 NOTE — TELEPHONE ENCOUNTER
PRIOR AUTHORIZATION DENIED    Medication: Omeprazole    Denial Date: 6/6/2024    Denial Rational:  Coverage is provided in situations where the patient is less then or equal to 12 years of age. Review and appeal are not available because of this exclusion.                Appeal Information:  N/A

## 2024-06-16 ENCOUNTER — HEALTH MAINTENANCE LETTER (OUTPATIENT)
Age: 24
End: 2024-06-16

## 2024-07-09 ENCOUNTER — OFFICE VISIT (OUTPATIENT)
Dept: URGENT CARE | Facility: URGENT CARE | Age: 24
End: 2024-07-09
Payer: COMMERCIAL

## 2024-07-09 VITALS
DIASTOLIC BLOOD PRESSURE: 86 MMHG | HEART RATE: 74 BPM | OXYGEN SATURATION: 98 % | TEMPERATURE: 98.7 F | SYSTOLIC BLOOD PRESSURE: 135 MMHG

## 2024-07-09 DIAGNOSIS — R21 RASH: Primary | ICD-10-CM

## 2024-07-09 PROCEDURE — 87252 VIRUS INOCULATION TISSUE: CPT | Mod: 90 | Performed by: FAMILY MEDICINE

## 2024-07-09 PROCEDURE — 99213 OFFICE O/P EST LOW 20 MIN: CPT | Performed by: FAMILY MEDICINE

## 2024-07-09 PROCEDURE — 99000 SPECIMEN HANDLING OFFICE-LAB: CPT | Performed by: FAMILY MEDICINE

## 2024-07-09 RX ORDER — CEPHALEXIN 500 MG/1
500 CAPSULE ORAL 3 TIMES DAILY
Qty: 30 CAPSULE | Refills: 0 | Status: SHIPPED | OUTPATIENT
Start: 2024-07-09 | End: 2024-07-19

## 2024-07-09 RX ORDER — MUPIROCIN 20 MG/G
OINTMENT TOPICAL 3 TIMES DAILY
Qty: 15 G | Refills: 0 | Status: SHIPPED | OUTPATIENT
Start: 2024-07-09 | End: 2024-07-19

## 2024-07-09 NOTE — PROGRESS NOTES
SUBJECTIVE: Figueroa Del Cid is a 23 year old male presenting with a chief complaint of weepy rash rt dianne.  Onset of symptoms was day(s) ago.  Course of illness is worsening.      No past medical history on file.  No Known Allergies  Social History     Tobacco Use    Smoking status: Never    Smokeless tobacco: Never   Substance Use Topics    Alcohol use: Not on file       ROS:  SKIN: no rash  GI: no vomiting    OBJECTIVE:  /86   Pulse 74   Temp 98.7  F (37.1  C)   SpO2 98% GENERAL APPEARANCE: healthy, alert and no distress  EYES: EOMI,  PERRL, conjunctiva clear  SKIN: weepy rash rt dianne      ICD-10-CM    1. Rash  R21 mupirocin (BACTROBAN) 2 % external ointment     cephALEXin (KEFLEX) 500 MG capsule     Viral Culture Non-respiratory          Fluids/Rest, f/u if worse/not any better

## 2024-10-21 NOTE — PROGRESS NOTES
"   PSYCHIATRIC  DIAGNOSTIC  EVALUATION                                                                    Name:  Figueroa Del Cid  : 2000     Source of Referral:  Primary Care Provider: Physician No Ref-Primary   Referred by: Farhan Moreno MD (doesn't plan to see again as PCP)  Current Psychotherapist: none currently     PCP Clinical Question for referral: \"Executive function issues, underwent formal neuropsych testing\"    The [facility] psychiatry providers act as a specialty service for Primary Care Providers in the [facility] who seek to optimize medications for unstable patients. Once medications have been optimized, [facility] providers discharge the patient back to the referring Primary Care Provider for ongoing medication management. This type of system allows [facility] to serve a high volume of patients.     Identifying Data:  Patient is a 23 year old, partnered / significant other White Not  or  male  who presents for initial visit with me.    Patient prefers to be called: \"Rangel\".  Patient is currently unemployed.     Patient attended the session alone.    HPI  Patient presents for initial psychiatric evaluation with the [facility] Service ([facility]) for evaluation of depression and attentional problems.      RECORDS AVAILABLE FOR REVIEW: EHR records through [facility]  and I have reviewed the assessment completed by EUGENE Chacon, dated today .       Chief Complaint:  \"Depression and ADHD-like symptoms\"    Per [facility] [place], EUGENE Chacon, during today's team-based visit:  \"Met with pt for initial assessment. Reports suffering form general depression for most of his life. First sought therapy and psychiatry in college. Found medications and therapy beneficial. Pt had been working with psychiatry and therapy but had ended care about 3 months ago. Pt at that time stopped taking bupropion and sertraline (which reports not feeling much benefit). Since has noted " "increase in depressive sx ie apathy, low motivation and feeling not rested after sleeping consistently 14 hrs per day. Pt does identify some anxiety sx, does not feel they are \"in the traditional sense of anxiety.\" Often worries about presentation to others or making others feel uncomfortable. Pt also discussed concerns around attention, focus and memory. Had neurological testing in spring 2024 with no clear dx of ADHD. Pt finds sx very problematic in his life. Denies SI/HI/SIB. \"    Figueroa Del Cid is a 23 year old White, male who presents for initial visit with [facility] ([facility]) for medication management. Carries past diagnoses: Major Depressive Disorder. Additional medical comorbidities include: eosinophilic esophagitis. Patient reports history of psych provider and therapy through [facility] who he is no longer seeing and looking to establish care. After discussing [facility] model, he is most interested in referral for long term psych which will be sent today. No history of psych hospitalizations or suicide attempts. He reports stopping past medications (sertraline, bupropion) ~3 months ago due to feeling not effective (sertraline) and then provider not filling bupropion. Denies significant change without sertraline but has noticed struggling much more with mood and focus since off bupropion. Reports no history of side effects with bupropion previously, well tolerated. Never up to 450 mg dose. No history of seizures.    DEPRESSION: reports mod-severe depression today (PHQ9: 16), associated with significant anhedonia, low mood, poor sleep, anergia, low appetite, feelings of guilt / worthlessness, trouble concentrating. Denies any SI, no history, no past NSSIB, HI. He does report losing friend to suicide in [place], which has overall been protective for him as \"very much pushed away from that as an option\" due to seeing ramifications. Denies any benefit previously to depression with " "sertraline up to 100 mg. Reports after starting bupropion ~3 years ago had \"notable increase in mental health\" as he was able to get out of bed, attend classes. Still could have depressed mood, but \"got rid of the very low end of the depressive episodes.\" Patient does report long standing issue with insomnia and hypersomnia intermittently (can struggle to fall asleep for several hours, then sleeping 12-13 hours / night from 4AM - 4PM). Does admit to frequent screentime QHS. Reports regardless of sleep feels tired \"everyday\", miguel angel worse since off bupropion.    ANXIETY: reports a low level of anxiety today (GAD7: 4), associated with restlessness, trouble relaxing, irritability. Denies significant worries or rumination. Reports anxiety is \"newer for me\", as feels it has just been generally in the background whole life. He denies any worsening with bupropion previously to anxiety. Denies history of panic attacks, does report 1x last week felt close to panic after cutting his hand / seeing blood unexpectedly but resolved quickly. Did not find sertraline improved anxiety, no worsening after discontinued medication.    ADHD: patient reports significant trouble with focus, inattention, amotivation and previously completed neuropsych evaluation (?Gretta) that unfortunately is not available to review today, but per PCP note no ADHD diagnosis indicated. No history of diagnosis or treatment at young age, no family history of ADHD. He does report bupropion may have been helpful for these symptoms but \"felt like wasn't doing enough.\"    Denies any history of samantha, psychosis, problematic substance use or past CD treatment. Does have family history + for bipolar disorder (aunt) per patient.      Current Medications:    Current Outpatient Medications:     buPROPion (WELLBUTRIN XL) 300 MG 24 hr tablet, Take 300 mg by mouth daily, Disp: , Rfl:     cetirizine (ZYRTEC) 10 MG tablet, Take 10 mg by mouth daily, Disp: , Rfl:     " "omeprazole (PRILOSEC) 20 MG DR capsule, Take 1 capsule (20 mg) by mouth daily, Disp: 90 capsule, Rfl: 3    sertraline (ZOLOFT) 100 MG tablet, Take 1 tablet by mouth daily, Disp: , Rfl:     Medication side effects:  n/a    The [place] Prescription Monitoring Program has been reviewed and there are no concerns about diversionary activity for controlled substances at this time.  No data for controlled substances over the last one year.    Psychiatric Review of Symptoms:  Depression: depressed mood, little interest / pleasure, appetite change or significant weight loss / gain, sleep changes (insomnia or hypersomnia), psychomotor agitation or retardation, fatigue of loss of energy, worthlessness or excessive guilt, and difficulty concentrating or indecisiveness Self rates as 8-9/ 10, where 0 is none at all and 10 being severe depression.  SI/SIB/HI: denies all.    Anxiety: restlessness / feeling keyed up,  difficulty concentrating or mind going blank, and irritability   Sleep / changes: \"very disregular\" - avg 12-13 hours / night , will be up until 3-4 AM and then sleep until 3-4PM. Or up all night , usually on phone and can be up.   Energy: \"tired always\"   Appetite or weight changes: \"always had relatively low appetite\", denies worsening with bupropion. Still struggles with low appetite, eat \"enough\" usually 1 major meal in a day.    Irritability / mood swings:  Tasia / impulsivity / racing thoughts / speech: denies   Panic (description): denies history, 1x close last week \"realized actively feeling panic over something didn't have to be panicked about.\" (Cut hand and had blood he wasn't expecting)  OCD:  Skin picking / hair pulling:  Psychosis/AH/VH/delusions: denies  Paranoia: denies  Eating DO: No Symptoms . Chronic low appetite, no intentional restriction or purging behaviors reported.  Trauma sx: requires further evaluation at patient's comfort. History: death of father at 13 yrs     All other ROS negative. "     PHQ-9 scores:       7/9/2024     4:43 PM 10/22/2024    12:53 AM   PHQ-9 SCORE   PHQ-9 Total Score MyChart 12 (Moderate depression) 16 (Moderately severe depression)   PHQ-9 Total Score 12 16    16       DANNIE-7 scores:        10/22/2024     1:12 AM   DANNIE-7 SCORE   Total Score 4 (minimal anxiety)   Total Score 4       Promis-10   2369-2691 [facility] [employer] Version 1.1    Question 10/22/2024  1:11 AM CDT - Filed by Patient   In general, would you say your health is: Good   In general, would you say your quality of life is: Good   In general, how would you rate your physical health? Good   In general, how would you rate your mental health, including your mood and your ability to think? Poor   In general, how would you rate your satisfaction with your social activities and relationships? Very good   In general, please rate how well you carry out your usual social activities and roles. (This includes activities at home, at work and in your community, and responsibilities as a parent, child, spouse, employee, friend, etc.) Fair   To what extent are you able to carry out your everyday physical activities such as walking, climbing stairs, carrying groceries, or moving a chair? Completely   In the past 7 days    How often have you been bothered by emotional problems such as feeling anxious, depressed or irritable? Often   How would you rate your fatigue on average? Moderate   How would you rate your pain on average?   0 = No Pain  to  10 = Worst Imaginable Pain 0       Medical Review of Systems:  10 systems (general, cardiovascular, respiratory, eyes, ENT, endocrine, GI, , M/S, neurological) were reviewed. Most pertinent finding(s) is/are:  + fatigue. + low appetite. No acute distress; no wheezing / short of breath / increased work of breathing; denies chest pain / tightness / palpitations; no nausea / vomiting / abdominal pain; no tics / tremors / abnormal muscle mvmts; no visible  "skin changes / rashes. The remaining systems are all unremarkable    Vitals:   There were no vitals taken for this visit.    Pulse Readings from Last 5 Encounters:   07/09/24 74   05/28/24 82   04/16/24 104   07/29/21 74     Wt Readings from Last 5 Encounters:   05/28/24 85.8 kg (189 lb 1.6 oz)   04/16/24 77.1 kg (170 lb)   07/29/21 77.1 kg (170 lb)     BP Readings from Last 5 Encounters:   07/09/24 135/86   05/28/24 128/82   04/16/24 (!) 151/98   07/29/21 129/82       Labs:    Recent Labs   Lab Test 04/16/24  1313 07/29/21  1203 06/20/19  0923   WBC 5.4   < > 6.3   HGB 14.6  --  15.1   HCT 43.4  --  45.0   MCV 83  --  84     --  277   ANEU  --   --  2.4    < > = values in this interval not displayed.     Recent Labs   Lab Test 04/16/24  1313      POTASSIUM 3.4   CHLORIDE 104   CO2 22   GLC 80   EMMY 8.9   BUN 8.8   CR 0.97   GFRESTIMATED >90   ALBUMIN 4.8   PROTTOTAL 6.9   AST 18   ALT 16   ALKPHOS 52   BILITOTAL 0.3     No lab results found.  Recent Labs   Lab Test 06/20/19  0923   TSH 6.92*     No results found for: \"OOY692\", \"OYES540\", \"AIEE11JRHGC\", \"VITD3\", \"D2VIT\", \"D3VIT\", \"DTOT\", \"NI11136789\", \"CQ00611992\", \"[id number]\", \"[id number]\", \"[id number]\", \"[id number]\"      Psychiatric History:   Hospitalizations: None  History of Commitment? No   Past Treatment: counseling and psychiatry  History of Suicidal Ideation: denies  Suicide Attempts: No   History of Violence/Aggression: No   Self-injurious Behavior: Denies  Electroconvulsive Therapy (ECT) or Transcranial Magnetic Stimulation (TMS): No   GeneSight Genetic Testing: No     Past psychotropic medication trials include but are not limited to:   Sertraline up to 100 mg (~4092-2315) : denies significant benefit, no side effects   Bupropion XL up to 300 mg  Hydroxyzine ?dose: added as \"sleep aid\", maybe mild benefit (\"hit or miss\")    Substance Use History:  Current Use of Drugs/Alcohol: Alcohol  - very infrequently, \"never to excess\" per pt. THC: " "tried a couple times , never regularly.  Past Use of Drugs/Alcohol: denies significant   Patient reports no problems as a result of their drinking / drug use.   Patient has not received chemical dependency treatment in the past  Recovery Programming Involvement: Not Applicable    Tobacco use: No  Caffeine:  Yes  1-2 sodas/day, thinks 100 mg caffeine. No energy drinks,coffee.     Based on the clinical interview, there  are not indications of drug or alcohol abuse. Continue to monitor.   Discussed effect of substance use on overall health.   CAGE-AID Score today was: 0     Family History:   Patient reported family history includes: No family history on file.   Sudden cardiac death at young age? denies  Mental Illness History: Denies any diagnosis / treatment. Think \"a lot of them probably could benefit from some.\" Depression, focus issues. Aunt who is bipolar.   Substance Abuse History: Denies  Suicide History: Denies family. Did have friend die by suicide as teenager.  Medications that helped: Denies     Social History:   Birth place: [place], [place]  Childhood: Reported as raised by biological parents. Mother  when patient was 13 years old.  Siblings:  3 full sibligns  Highest education level was college graduate.   Employment Status: unemployed  Relationship status: partner  Current Living situation:  roommate. Feels safe at home.  Children: zero   Firearms/Weapons Access: No: Patient denies   Service: No  Support: partner, mother    Legal History:  No: Patient denies any legal history    Significant Losses / Trauma / Abuse / Neglect Issues:  There are indications or report of significant loss, trauma, abuse or neglect issues related to: death of father at 13 years old .   Issues of possible neglect are not present.   Recommended that patient call 911 or go to the local ED should there be a change in any of these risk factors    Past Medical History:  Reviewed per Electronic Medical Record " Today    No past medical history on file.   Surgery: No past surgical history on file.  Food and Medicine Allergies:   No Known Allergies  Seizures or Head Injury: no seizures. A couple mild concussions as a child. Did go to ED.  Diet: No Restrictions  Exercise: No regular exercise program reported  Supplements:       Mental Status Exam:  Alertness: alert  and oriented   Appearance: adequately groomed  Behavior/Demeanor: cooperative, pleasant, and calm, with good eye contact   Speech: normal and regular rate and rhythm  Language: intact and no problems  Psychomotor: normal or unremarkable  Mood: depressed and anxious  Affect: full range and appropriate; was congruent to mood; was congruent to content  Thought Process/Associations: unremarkable  Thought Content:  Reports none;  Denies suicidal ideation, violent ideation, and delusions  Perception:  Reports none;  Denies auditory hallucinations and visual hallucinations  Insight: intact  Judgment: intact  Cognition: does  appear grossly intact; formal cognitive testing was not done  Recent and Remote Memory: Intact to interview. Not formally assessed. No amnesia.  Attention Span and Concentration: intact to interview  Fund of Knowledge:  appropriate  Gait and Station: unremarkable    Strengths and Opportunities:   Figueroa Ramirez Maria Eugenia identified the following strengths or resources that may help he succeed in counseling:  family / friend social support (fair) .     Things that may interfere with the patient's success include:  financial hardship.    Protective Factors: future oriented, healthy intimate relationships, restricted access to means, social support, motivation and readiness for change, reasons for living, and displaying help seeking behavior    Static Risk Factors: age, sex, and history of MH diagnoses and/or treatment    Dynamic Risk Factors: emotional distress, insomnia, anxiety, financial problems, and work related problems    There are no language or  "communication issues or need for modification in treatment.   There are ethnic, cultural or [place] factors that may be relevant for therapy: \", raised in a household with little trust for medical institutions\"   Client identified their preferred language to be English.  Client does not need the assistance of an  or other support involved in therapy.    Suicide Risk Assessment:  Based on review of above risk and protective factors and today's exam, pt is at low acute risk of harm to self or others and chronic elevated risk due to history and risk factors. He does not meet criteria for a 72 hr hold and remains appropriate for ongoing outpatient care. The patient convincingly denies suicidality today. There was no deceit detected, and the patient presented in a manner that was believable. Local community safety resources reviewed and sent to patient to use if needed.    Recommended that patient call 911 or go to the local ED should there be a change in any of these risk factors    DSM5  Diagnosis:  296.32 (F33.1) Major Depressive Disorder, Recurrent Episode, Moderate _  300.00 (F41.9) Unspecified Anxiety Disorder    Medical Comorbidities Include:   Patient Active Problem List    Diagnosis Date Noted    Eosinophilic esophagitis 05/28/2024     Priority: Medium    Current moderate episode of major depressive disorder without prior episode (H) 05/04/2023     Priority: Medium    Difficulty concentrating 05/04/2023     Priority: Medium       DIFFERENTIAL DIAGNOSIS: R/O Generalized Anxiety Disorder versus social anxiety disorder     Medical comorbidities impacting or contributing to clinical picture: eosinophilic esophagitis  Known issue that I take into account for their medical decisions, no current exacerbations or new concerns.    Impression:  Figueroa Del Cid is a 23 year old White, male who presents for initial visit with [facility] ([facility]) for medication " management. Carries past diagnoses: Major Depressive Disorder. After discussing [facility] model, he is most interested in referral for long term psych which will be sent today. No history of psych hospitalizations or suicide attempts. He reports stopping past medications (sertraline, bupropion) ~3 months ago due to feeling not effective but without bupropion has had significant worsening in depression, energy, and focus. He does not have history of ADHD diagnosis and past neuropsych testing did not rule in. Denies any SI/SIB/HI, samantha, psychosis, problematic substance use or past CD treatment. He is most interested in restarting bupropion given past improvement and good tolerability.  We will plan to do this and follow-up with this provider + Behavioral Health Consultant in 1 month, unless scheduled with long term psychiatry by then. Patient agreeable to plan.    Medication side effects and alternatives reviewed. Health promotion activities recommended and reviewed today. All questions addressed. Education and counseling completed regarding risks and benefits of medications and psychotherapy options. Recommend therapy for additional support.     Treatment Plan:  RESTART bupropion  mg every day for 1 week. Then INCREASE to bupropion  mg every day. 2 scripts sent.  Continue all other medications per primary care provider.   Referral placed for long term psychiatry. [facility] will call you to coordinate your care as prescribed by your provider. If you don't hear from a representative within 2 business days, please call [phone].   Safety plan reviewed. To the Emergency Department as needed or call after hours crisis line at [phone] or [phone]. [place] Crisis Text Line: Text [place] to 206587 or  Suicide LifeLine Chat: suicidepreventionlifeline.org/chat  Schedule an appointment with me and Behavioral Health Consultant in 4 weeks or sooner as needed, unless scheduled with long term provider.   Call [facility] at [phone] to schedule.  Follow up with primary care provider as planned or sooner if needed for acute medical concerns.  Call the psychiatric nurse line with medication questions or concerns at [phone].  MyChart may be used to communicate with your provider, but this is not intended to be used for emergencies.    Patient Education:  Medication side effects and alternatives reviewed. Health promotion activities recommended and reviewed today. All questions addressed. Education and counseling completed regarding risks and benefits of medications and psychotherapy options.  Consent provided by patient/guardian  Call the psychiatric nurse line with medication questions or concerns at [phone].  MyChart may be used to communicate with your provider, but this is not intended to be used for emergencies.  Integrated Diagnostics.gov is information for patients.  It is run by the [employer] of Medicine and it contains information about all disorders, diseases and all medications.      Discussed side effects of bupropion to include agitation and other activation issues, ^ BP or HR, insomnia, stomach upset, appetite loss, weight loss, risk for precipitation of samantha, and increased risk for seizures.  Patient agreed to take Bupropion to treat low mood, lack of motivation and poor concentration. Patient verbalized understanding of medication schedule, of side effects, avoidance of alcohol and marijuana due to increase risk for seizures.    Community Resources:    National Suicide Prevention Lifeline: 286.257.9845 (TTY: 267.399.8752). Call anytime for help.  (www.suicidepreventionlifeline.org)  National [place] on Mental Illness (www.gt.org): 730.693.4646 or 227-307-6121.   Mental Health Association (www.mentalhealth.org): 763.428.7629 or 579-240-7708.  [place] Crisis Text Line: Text [place] to 986641  Suicide LifeLine Chat: [employer]Core Mobile [employer].org/chat    Administrative Billing:      Level of Medical Decision Making:   - At least 1 chronic problem that is not stable  - Engaged in prescription drug management during visit (discussed any medication benefits, side effects, alternatives, etc.)           Patient Status:  [facility] MD/DO/NP/PA providers offer care a specialty service for Primary Care Providers in the [facility] that seek to optimize psychotropic medications for unstable patients.  Once medications have been optimized, our providers discharge the patient back to the referring Primary Care Provider for ongoing medication management.  This type of system allows our providers to serve a high volume of patients.   The patient is being referred to long term community psychiatry care and provider will provide bridging until patient is established with new community provider.     Signed:   Georgette Lee, MSN, APRN, PMHNP-BC  [facility] ([facility])  [facility]    Chart documentation done in part with Dragon Voice Recognition software.  Although reviewed after completion, some word and grammatical errors may remain.

## 2024-10-21 NOTE — PROGRESS NOTES
"    [facility] Psychiatry Services - [place]         PATIENT'S NAME: Figueroa Del Cid  PREFERRED NAME: Rangel  PRONOUNS:       MRN: [id number]  : 2000  ADDRESS: [address] [address]  ACCT. NUMBER:  [id number]  DATE OF SERVICE: 10/22/24  START TIME: 9:55 am  END TIME: 10:30 am  PREFERRED PHONE: [phone]  May we leave a program related message: Yes  EMERGENCY CONTACT: was obtained  .  SERVICE MODALITY:  In-person    [place] ADULT Mental Health DIAGNOSTIC ASSESSMENT    Identifying Information:  Patient is a 23 year old,   individual.  Patient was referred for an assessment by self.  Patient attended the session alone.    Chief Complaint:   The reason for seeking services at this time is: \"Depression and ADHD-like symptoms\".  The problem(s) began 09/01/15.    [facility] and was advised of the short-term, team based structure of the model including role of [facility] and provider. Patient indicated understanding of the model and agreed to proceed with services as described.    Met with pt for initial assessment. Reports suffering form general depression for most of his life. First sought therapy and psychiatry in college. Found medications and therapy beneficial. Pt had been working with psychiatry and therapy but had ended care about 3 months ago. Pt at that time stopped taking bupropion and sertraline (which reports not feeling much benefit). Since has noted increase in depressive sx ie apathy, low motivation and feeling not rested after sleeping consistently 14 hrs per day. Pt does identify some anxiety sx, does not feel they are \"in the traditional sense of anxiety.\" Often worries about presentation to others or making others feel uncomfortable. Pt also discussed concerns around attention, focus and memory. Had neurological testing in spring 2024 with no clear dx of ADHD. Pt finds sx very problematic in his life.    Denies " "SI/HI/SIB.     Patient has attempted to resolve these concerns in the past through therapy and medications .    Social/Family History:  Patient reported they grew up in [place]  .  They were raised by biological parents  .  Parents .  Patient reported that their childhood was \"fine, relatively good\".  Patient described their current relationships with family of origin as mildly strained.     The patient describes their cultural background as .  Cultural influences and impact on patient's life structure, values, norms, and healthcare: Raised in a household with little trust for medical institutions.  Contextual influences on patient's health include: Contextual Factors: Individual Factors management of MH .    These factors will be addressed in the Preliminary Treatment plan. Patient identified their preferred language to be English. Patient reported they does not need the assistance of an  or other support involved in therapy.     Patient reported had no significant delays in developmental tasks.   Patient's highest education level was college graduate  .  Patient identified the following learning problems: attention and concentration.  Modifications will not be used to assist communication in therapy.  Patient reports they are  able to understand written materials.    Patient's current relationship status is has a partner or significant other for 5 months.   Patient identified their sexual orientation as heterosexual.  Patient reported having    0 child(elba). Patient identified partner; mother as part of their support system.  Patient identified the quality of these relationships as fair,  .      Patient's current living/housing situation involves staying with someone.  The immediate members of family and household include Althea Del Cid, 60?,Mother  and they report that housing is stable.    Patient is currently unemployed. Has not been working for the past 8 or so months. Wanting " to get back on meds for motivation to apply to jobs. Patient reports their finances are obtained through parents. Patient does identify finances as a current stressor.      Patient reported that they have not been involved with the legal system.  Patient does not report being under probation/ parole/ jurisdiction. They are not under any current court jurisdiction. .    Patient's Strengths and Limitations:  Patient identified the following strengths or resources that will help them succeed in treatment: commitment to health and well being, friends / good social support, insight, and intelligence. Things that may interfere with the patient's success in treatment include:  MH .     Assessments:  The following assessments were completed by patient for this visit:  PHQ2:       5/28/2024     6:50 AM   PHQ-2 ( 2000 [employer])   Q1: Little interest or pleasure in doing things 1   Q2: Feeling down, depressed or hopeless 1   PHQ-2 Score 2   Q1: Little interest or pleasure in doing things Several days   Q2: Feeling down, depressed or hopeless Several days   PHQ-2 Score 2     PHQ9:       7/9/2024     4:43 PM 10/22/2024    12:53 AM   PHQ-9 SCORE   PHQ-9 Total Score MyChart 12 (Moderate depression) 16 (Moderately severe depression)   PHQ-9 Total Score 12 16    16     GAD2:       10/22/2024     1:10 AM   DANNIE-2   Feeling nervous, anxious, or on edge 0   Not being able to stop or control worrying 0   DANNIE-2 Total Score 0     GAD7:       10/22/2024     1:12 AM   DANNIE-7 SCORE   Total Score 4 (minimal anxiety)   Total Score 4     CAGE-AID:       10/22/2024     1:11 AM   CAGE-AID Total Score   Total Score 0    0   Total Score MyChart 0 (A total score of 2 or greater is considered clinically significant)     PROMIS 10-Global Health (all questions and answers displayed):       10/22/2024     1:11 AM   PROMIS 10   In general, would you say your health is: Good   In general, would you say your quality of life is: Good   In general, how would you  rate your physical health? Good   In general, how would you rate your mental health, including your mood and your ability to think? Poor   In general, how would you rate your satisfaction with your social activities and relationships? Very good   In general, please rate how well you carry out your usual social activities and roles Fair   To what extent are you able to carry out your everyday physical activities such as walking, climbing stairs, carrying groceries, or moving a chair? Completely   In the past 7 days, how often have you been bothered by emotional problems such as feeling anxious, depressed, or irritable? Often   In the past 7 days, how would you rate your fatigue on average? Moderate   In the past 7 days, how would you rate your pain on average, where 0 means no pain, and 10 means worst imaginable pain? 0   In general, would you say your health is: 3   In general, would you say your quality of life is: 3   In general, how would you rate your physical health? 3   In general, how would you rate your mental health, including your mood and your ability to think? 1   In general, how would you rate your satisfaction with your social activities and relationships? 4   In general, please rate how well you carry out your usual social activities and roles. (This includes activities at home, at work and in your community, and responsibilities as a parent, child, spouse, employee, friend, etc.) 2   To what extent are you able to carry out your everyday physical activities such as walking, climbing stairs, carrying groceries, or moving a chair? 5   In the past 7 days, how often have you been bothered by emotional problems such as feeling anxious, depressed, or irritable? 4   In the past 7 days, how would you rate your fatigue on average? 3   In the past 7 days, how would you rate your pain on average, where 0 means no pain, and 10 means worst imaginable pain? 0   Global Mental Health Score 10    10   Global Physical  Health Score 16    16   PROMIS TOTAL - SUBSCORES 26    26     PROMIS 10-Global Health (only subscores and total score):       10/22/2024     1:11 AM   PROMIS-10 Scores Only   Global Mental Health Score 10    10   Global Physical Health Score 16    16   PROMIS TOTAL - SUBSCORES 26    26     [place] Suicide Severity Rating Scale (Lifetime/Recent)      4/16/2024     1:04 PM 10/22/2024    10:20 AM   [place] Suicide Severity Rating (Lifetime/Recent)   Q1 Wished to be Dead (Past Month) 0-->no    Q2 Suicidal Thoughts (Past Month) 0-->no    Q6 Suicide Behavior (Lifetime) 0-->no    Level of Risk per Screen no risks indicated    Q1 Wish to be Dead (Lifetime)  N   Q2 Non-Specific Active Suicidal Thoughts (Lifetime)  N   Actual Attempt (Lifetime)  N   Has subject engaged in non-suicidal self-injurious behavior? (Lifetime)  N   Interrupted Attempts (Lifetime)  N   Aborted or Self-Interrupted Attempt (Lifetime)  N   Preparatory Acts or Behavior (Lifetime)  N   Calculated C-SSRS Risk Score (Lifetime/Recent)  No Risk Indicated       Personal and Family Medical History:  Patient does report a family history of mental health concerns.  Patient reports family history is not on file..     Patient does report Mental Health Diagnosis and/or Treatment.  Patient reported the following previous diagnoses which include(s): depression .  Patient reported symptoms began childhood.  Patient has received mental health services in the past:  therapy; psychiatry  .  Psychiatric Hospitalizations: none.    Patient denies a history of civil commitment.      Currently, patient none  is receiving other mental health services.  These include none.       Patient has had a physical exam to rule out medical causes for current symptoms.  Date of last physical exam was within the past year. Symptoms have developed since last physical exam and client was encouraged to follow up with PCP.  . The patient has a [place] Primary Care Provider, who is named No  Ref-Primary, Physician..  Patient reports no current medical concerns.  Patient denies any issues with pain..   There are significant appetite / nutritional concerns / weight changes.   Patient does report a history of head injury / trauma / cognitive impairment.     Current Outpatient Medications   Medication Sig Dispense Refill    buPROPion (WELLBUTRIN XL) 300 MG 24 hr tablet Take 300 mg by mouth daily      cetirizine (ZYRTEC) 10 MG tablet Take 10 mg by mouth daily      omeprazole (PRILOSEC) 20 MG DR capsule Take 1 capsule (20 mg) by mouth daily 90 capsule 3    sertraline (ZOLOFT) 100 MG tablet Take 1 tablet by mouth daily       No current facility-administered medications for this visit.         Medication Adherence:  Patient reports not currently prescribed.  taking psychiatric medications as prescribed.    Patient Allergies:  No Known Allergies    Medical History:  No past medical history on file.      Current Mental Status Exam:   Appearance:  Appropriate    Eye Contact:  Good   Psychomotor:  Normal       Gait / station:  no problem  Attitude / Demeanor: Cooperative   Speech      Rate / Production: Normal/ Responsive      Volume:  Normal  volume      Language:  intact  Mood:   Depressed   Affect:   Appropriate    Thought Content: Clear   Thought Process: Coherent  Logical       Associations: No loosening of associations  Insight:   Fair   Judgment:  Intact   Orientation:  All  Attention/concentration: Fair    Substance Use:   Patient did not report a family history of substance use concerns; see medical history section for details.  Patient has not received chemical dependency treatment in the past.  Patient has not ever been to detox.      Patient is not currently receiving any chemical dependency treatment.           Substance History of use Age of first use Date of last use     Pattern and duration of use (include amounts and frequency)   Alcohol currently use   17 10/19/24 REPORTS SUBSTANCE USE: reports  using substance 1 times per every 3 months and has 1 drink at a time.   Patient reports heaviest use is current use.   Cannabis   currently use 22 05/01/24 REPORTS SUBSTANCE USE: reports using substance 5 times per in total and has 1   at a time.   Patient reports heaviest use is current use.     Amphetamines   never used     REPORTS SUBSTANCE USE: N/A   Cocaine/crack    never used       REPORTS SUBSTANCE USE: N/A   Hallucinogens never used         REPORTS SUBSTANCE USE: N/A   Inhalants never used         REPORTS SUBSTANCE USE: N/A   Heroin never used         REPORTS SUBSTANCE USE: N/A   Other Opiates never used     REPORTS SUBSTANCE USE: N/A   Benzodiazepine   never used     REPORTS SUBSTANCE USE: N/A   Barbiturates never used     REPORTS SUBSTANCE USE: N/A   Over the counter meds never used     REPORTS SUBSTANCE USE: N/A   Caffeine currently use 8   Currently  mg per day, had been drinking 300 mg (+) per day about 2 yrs ago   Nicotine  never used     REPORTS SUBSTANCE USE: N/A   Other substances not listed above:  Identify:  never used     REPORTS SUBSTANCE USE: N/A     Patient reported the following problems as a result of their substance use: no problems, not applicable.    Substance Use: No symptoms    Based on the CAGE score of 0 and clinical interview there  are not indications of drug or alcohol abuse.    Significant Losses / Trauma / Abuse / Neglect Issues:   Patient did not  serve in the .  There are indications or report of significant loss, trauma, abuse or neglect issues related to: death of father at 13-14 yrs .  Concerns for possible neglect are not present.     Safety Assessment:   Patient denies current homicidal ideation and behaviors.  Patient denies current self-injurious ideation and behaviors.    Patient denied risk behaviors associated with substance use.   Patient denies any high risk behaviors associated with mental health symptoms.  Patient reports the following current  concerns for their personal safety: None.  Patient reports there are not firearms in the house.       There are no firearms in the home..    History of Safety Concerns:  Patient denied a history of homicidal ideation.     Patient denied a history of personal safety concerns.    Patient denied a history of assaultive behaviors.    Patient denied a history of sexual assault behaviors.     Patient denied a history of risk behaviors associated with substance use.  Patient denies any history of high risk behaviors associated with mental health symptoms.  Patient reports the following protective factors: access to and engagement with healthcare, supportive social network or family, and lives in a responsibly safe environment effectively controls impulses; other    Review of Symptoms per patient report:   Depression: Lack of interest or pleasure in doing things, Feeling sad, down, or depressed, Feelings of hopelessness, Change in energy level, Change in sleep, Change in appetite, Difficulties concentrating, Feelings of helplessness, Irritability, and Withdrawn apathy,   Tasia:  No Symptoms  Psychosis: No Symptoms  Anxiety: Excessive worry, Nervousness, and fear of how people perceive him  Panic:  No symptoms  Post Traumatic Stress Disorder:  No Symptoms and father  when young    Eating Disorder: No Symptoms  ADD / ADHD:  Inattentive, Difficulties listening, Poor task completion, Distractibility, Forgetful, Restlessness/fidgety, and neuro psych testing spring 2024 - felt results were not valid maria esther Glynn  Conduct Disorder: No symptoms  Autism Spectrum Disorder: No symptoms  Obsessive Compulsive Disorder: No Symptoms    Patient reports the following compulsive behaviors and treatment history:  na .      Diagnostic Criteria:   Major Depressive Disorder  A) Recurrent episode(s) - symptoms have been present during the same 2-week period and represent a change from previous functioning 5 or more symptoms (required for  diagnosis)   - Depressed mood. Note: In children and adolescents, can be irritable mood.     - Diminished interest or pleasure in all, or almost all, activities.    - Significant weight gaindecrease in appetite.    - Increased sleep.    - Fatigue or loss of energy.    - Feelings of worthlessness or inappropriate and excessive guilt.    - Diminished ability to think or concentrate, or indecisiveness.   B) The symptoms cause clinically significant distress or impairment in social, occupational, or other important areas of functioning  C) The episode is not attributable to the physiological effects of a substance or to another medical condition  D) The occurence of major depressive episode is not better explained by other thought / psychotic disorders  E) There has never been a manic episode or hypomanic episode    Functional Status:  Patient reports the following functional impairments:  organization, relationship(s), self-care, social interactions, and work / vocational responsibilities.     Nonprogrammatic care:  Patient is requesting basic services to address current mental health concerns.    Clinical Summary:  1. Psychosocial, Cultural and Contextual Factors: Individual Factors management of MH  .  2. Principal DSM5 Diagnoses  (Sustained by DSM5 Criteria Listed Above):   296.32 (F33.1) Major Depressive Disorder, Recurrent Episode, Moderate _ and With anxious distress.  3. Other Diagnoses that is relevant to services:   .  4. Provisional Diagnosis: R/O Attention-Deficit/Hyperactivity Disorder  314.01 (F90.2) Combined presentation as evidenced by see above .  5. Prognosis: Expect Improvement.  6. Likely consequences of symptoms if not treated: continued or worsening of sx.  7. Client strengths include:  educated, goal-focused, insightful, intelligent, open to learning, open to suggestions / feedback, support of family, friends and providers, wants to learn, willing to ask questions, and willing to relate to others  .     Recommendations:     1. Plan for Safety and Risk Management:   Safety and Risk: Recommended that patient call 911 or go to the local ED should there be a change in any of these risk factors.          Report to child / adult protection services was NA.     2. Patient's identified mental health concerns with a cultural influence will be addressed by therapy and psychiatry  .     3. Initial Treatment will focus on:    Depressed Mood - see above  Attentional Problems - see above .     4. Resources/Service Plan:    services are not indicated.   Modifications to assist communication are not indicated.   Additional disability accommodations are not indicated.      5. Collaboration:   Collaboration / coordination of treatment will be initiated with the following  support professionals: psychiatry.      6.  Referrals:   The following referral(s) will be initiated:  will continue to assess .       A Release of Information has been obtained for the following:  na .     Clinical Substantiation/medical necessity for the above recommendations:  na.    7. MOSES:    MOSES:  Discussed the general effects of drugs and alcohol on health and well-being. Provider gave patient printed information about the  effects of chemical use on their health and well being. Recommendations:  limit .     8. Records:   These were not available for review at time of assessment.   Information in this assessment was obtained from the medical record and  provided by patient who is a good historian.    Patient will have open access to their mental health medical record.    9.   Interactive Complexity: No    10. Safety Plan:       Provider Name/ Credentials:  Ann MAY LICSW  October 22, 2024        Answers submitted by the patient for this visit:  Patient Health Questionnaire (Submitted on 10/22/2024)  If you checked off any problems, how difficult have these problems made it for you to do your work, take care of things at home, or get  along with other people?: Extremely difficult  PHQ9 TOTAL SCORE: 16

## 2024-10-22 ENCOUNTER — OFFICE VISIT (OUTPATIENT)
Dept: PSYCHIATRY | Facility: CLINIC | Age: 24
End: 2024-10-22
Payer: COMMERCIAL

## 2024-10-22 ENCOUNTER — OFFICE VISIT (OUTPATIENT)
Dept: BEHAVIORAL HEALTH | Facility: CLINIC | Age: 24
End: 2024-10-22
Payer: COMMERCIAL

## 2024-10-22 DIAGNOSIS — F33.1 MODERATE EPISODE OF RECURRENT MAJOR DEPRESSIVE DISORDER (H): Primary | ICD-10-CM

## 2024-10-22 DIAGNOSIS — F41.9 ANXIETY: ICD-10-CM

## 2024-10-22 DIAGNOSIS — F32.1 CURRENT MODERATE EPISODE OF MAJOR DEPRESSIVE DISORDER WITHOUT PRIOR EPISODE (H): Primary | ICD-10-CM

## 2024-10-22 PROCEDURE — 99204 OFFICE O/P NEW MOD 45 MIN: CPT | Performed by: NURSE PRACTITIONER

## 2024-10-22 PROCEDURE — 90791 PSYCH DIAGNOSTIC EVALUATION: CPT | Performed by: SOCIAL WORKER

## 2024-10-22 RX ORDER — BUPROPION HYDROCHLORIDE 300 MG/1
300 TABLET ORAL DAILY
Qty: 30 TABLET | Refills: 1 | Status: SHIPPED | OUTPATIENT
Start: 2024-10-22

## 2024-10-22 RX ORDER — BUPROPION HYDROCHLORIDE 150 MG/1
150 TABLET ORAL DAILY
Qty: 7 TABLET | Refills: 0 | Status: SHIPPED | OUTPATIENT
Start: 2024-10-22

## 2024-10-22 ASSESSMENT — ANXIETY QUESTIONNAIRES
5. BEING SO RESTLESS THAT IT IS HARD TO SIT STILL: SEVERAL DAYS
7. FEELING AFRAID AS IF SOMETHING AWFUL MIGHT HAPPEN: NOT AT ALL
3. WORRYING TOO MUCH ABOUT DIFFERENT THINGS: NOT AT ALL
2. NOT BEING ABLE TO STOP OR CONTROL WORRYING: NOT AT ALL
4. TROUBLE RELAXING: MORE THAN HALF THE DAYS
GAD7 TOTAL SCORE: 4
GAD7 TOTAL SCORE: 4
6. BECOMING EASILY ANNOYED OR IRRITABLE: SEVERAL DAYS
7. FEELING AFRAID AS IF SOMETHING AWFUL MIGHT HAPPEN: NOT AT ALL
8. IF YOU CHECKED OFF ANY PROBLEMS, HOW DIFFICULT HAVE THESE MADE IT FOR YOU TO DO YOUR WORK, TAKE CARE OF THINGS AT HOME, OR GET ALONG WITH OTHER PEOPLE?: NOT DIFFICULT AT ALL
IF YOU CHECKED OFF ANY PROBLEMS ON THIS QUESTIONNAIRE, HOW DIFFICULT HAVE THESE PROBLEMS MADE IT FOR YOU TO DO YOUR WORK, TAKE CARE OF THINGS AT HOME, OR GET ALONG WITH OTHER PEOPLE: NOT DIFFICULT AT ALL
1. FEELING NERVOUS, ANXIOUS, OR ON EDGE: NOT AT ALL
GAD7 TOTAL SCORE: 4

## 2024-10-22 ASSESSMENT — PATIENT HEALTH QUESTIONNAIRE - PHQ9
10. IF YOU CHECKED OFF ANY PROBLEMS, HOW DIFFICULT HAVE THESE PROBLEMS MADE IT FOR YOU TO DO YOUR WORK, TAKE CARE OF THINGS AT HOME, OR GET ALONG WITH OTHER PEOPLE: EXTREMELY DIFFICULT
SUM OF ALL RESPONSES TO PHQ QUESTIONS 1-9: 16
10. IF YOU CHECKED OFF ANY PROBLEMS, HOW DIFFICULT HAVE THESE PROBLEMS MADE IT FOR YOU TO DO YOUR WORK, TAKE CARE OF THINGS AT HOME, OR GET ALONG WITH OTHER PEOPLE: EXTREMELY DIFFICULT

## 2024-10-22 ASSESSMENT — COLUMBIA-SUICIDE SEVERITY RATING SCALE - C-SSRS
TOTAL  NUMBER OF INTERRUPTED ATTEMPTS LIFETIME: NO
1. HAVE YOU WISHED YOU WERE DEAD OR WISHED YOU COULD GO TO SLEEP AND NOT WAKE UP?: NO
ATTEMPT LIFETIME: NO
6. HAVE YOU EVER DONE ANYTHING, STARTED TO DO ANYTHING, OR PREPARED TO DO ANYTHING TO END YOUR LIFE?: NO
TOTAL  NUMBER OF ABORTED OR SELF INTERRUPTED ATTEMPTS LIFETIME: NO
2. HAVE YOU ACTUALLY HAD ANY THOUGHTS OF KILLING YOURSELF?: NO

## 2024-10-22 NOTE — PATIENT INSTRUCTIONS
**For crisis resources, please see the information at the end of this document**     Thank you for coming to the [facility] [place] [facility].    TREATMENT PLAN:    You have been referred for long-term psychiatric care due to the complexity, chronicity, and severity of your psychiatric case.  It is my opinion that your care will be more optimally managed by a long-term psychiatric prescriber versus returning your psychiatric care back to your primary care provider for ongoing management.  Someone should call you to get you scheduled.  If you do not hear from anyone please call [phone] to get scheduled.  If your appointment to establish care with a long-term psychiatric provider is greater than 4 weeks out from today's visit, please schedule a follow-up visit with me in 4 weeks from now.  As discussed during your appointment, I am agreeable to bridging care for up to 3 months only (until 1/22/2025), or until you establish with new provider, whichever comes first. Please make sure you keep any scheduled appointments.  If you have not scheduled with a long-term provider within the 3 months, your care will be returned to your primary care provider.      Medications:   RESTART bupropion  mg every day for 1 week. Then INCREASE to bupropion  mg every day. 2 scripts sent.  Continue all other medications per primary care provider.     Consults / Referrals:   - Referral placed for long term psychiatry. [facility] will call you to coordinate your care as prescribed by your provider. If you don't hear from a representative within 2 business days, please call [phone].     Follow-up:  Schedule an appointment with me and Behavioral Health Consultant in 4 weeks or sooner as needed, unless scheduled with long term provider.  Call [facility] at [phone] to schedule.  Follow up with primary care provider as planned or sooner if needed for acute medical  concerns.  Call the psychiatric nurse line with medication questions or concerns at [phone].  MyChart may be used to communicate with your provider, but this is not intended to be used for emergencies.    Psychoeducation:  Discussed side effects of bupropion to include agitation and other activation issues, ^ BP or HR, insomnia, stomach upset, appetite loss, weight loss, risk for precipitation of samantha, and increased risk for seizures.  Patient agreed to take Bupropion to treat low mood, lack of motivation and poor concentration. Patient verbalized understanding of medication schedule, of side effects, avoidance of alcohol and marijuana due to increase risk for seizures.        Financial Assistance [phone]  [facility] Billing [phone]  Central Billing Office, [facility]: [phone]  [place] Billing [phone]  Medical Records [phone]  [place] Patient Bill of Rights [email or web address]       MENTAL HEALTH CRISIS RESOURCES:  For a emergency help, please call 911 or go to the nearest Emergency Department.     Emergency Walk-In Options:   EmPATH Unit @ [facility] ([place]): [phone] - Specialized mental health emergency area [facility]  [facility] ([place]): [phone]  [facility] ([place]): [phone]  [facility]): [phone]    [facility] Crisis Information:   [place]: [phone]  [place]: [phone]  [place] ([place]UB[place]) - Adult: [phone]     Child: [phone]  [place] - Adult: [phone]     Child: [phone]  [place]: [phone]  List of all [facility] resources:   https://[place].gov/dhs/people-we-serve/adults/health-care/mental-health/resources/crisis-contacts.jsp    National Crisis Information:   National Suicide & Crisis Lifeline: Call 988        For online chat options, visit https://suicidepreventionlifeline.org/chat/  Poison  Control Center: 9-855-366-1354  Poison Control Center: 3-524-945-1577  Trans Lifeline: 8-823-351-8822 - Hotline for transgender people of all ages  The [employer]: 7-617-720-3183 - Hotline for LGBT youth     For Non-Emergency Support:   Fast Tracker: Mental Health & Substance Use Disorder Resources -   https://www.[employer]People[employer]n.org/       Again thank you for choosing [facility] and please let us know how we can best partner with you to improve you and your family's health.    You may be receiving a survey regarding this appointment. We would love to have your feedback, both positive and negative. The survey is done by an external company, so your answers are anonymous.        Patient Education   Collaborative Care Psychiatry Service  What to Expect  Here's what to expect from your Collaborative Care Psychiatry Service (CCPS).   About CCPS  CCPS means 2 people work together to help you get better. You'll meet with a behavioral health clinician and a psychiatric doctor. A behavioral health clinician helps people with mental health problems by talking with them. A psychiatric doctor helps people by giving them medicine.  How it works  At every visit, you'll see the behavioral health clinician (BHC) first. They'll talk with you about how you're doing and teach you how to feel better.   Then you'll see the psychiatric doctor. This doctor can help you deal with troubling thoughts and feelings by giving you medicine. They'll make sure you know the plan for your care.   CCPS usually takes 3 to 6 visits. If you need more visits, we may have you start seeing a different psychiatric doctor for ongoing care.  If you have any questions or concerns, we'll be glad to talk with you.  About visits  Be open  At your visits, please talk openly about your problems. It may feel hard, but it's the best way for us to help you.  Cancelling visits  If you can't come to your visit, please  "call us right away at [phone]. If you don't cancel at least 24 hours (1 full day) before your visit, that's \"late cancellation.\"  Being late to visits  Being very late is the same as not showing up. You will be a \"no show\" if:  Your appointment starts with a [facility], and you're more than 15 minutes late for a 30-minute (half hour) visit. This will also cancel your appointment with the psychiatric doctor.  Your appointment is with a psychiatric doctor only, and you're more than 15 minutes late for a 30-minute (half hour) visit.  Your appointment is with a psychiatric doctor only, and you're more than 30 minutes late for a 60-minute (full hour) visit.  If you cancel late or don't show up 2 times within 6 months, we may end your care.   Getting help between visits  If you need help between visits, you can call us Monday to Friday from 8 a.m. to 4:30 p.m. at [phone].  Emergency care  Call 911 or go to the nearest emergency department if your life or someone else's life is in danger.  Call 988 anytime to reach the national Suicide and Crisis hotline.  Medicine refills  To refill your medicine, call your pharmacy. You can also call [facility]'s Behavioral Access at [phone], Monday to Friday, 8 a.m. to 4:30 p.m. It can take 1 to 3 business days to get a refill.   Forms, letters, and tests  You may have papers to fill out, like FMLA, short-term disability, and workability. We can help you with these forms at your visits, but you must have an appointment. You may need more than 1 visit for this, to be in an intensive therapy program, or both.  Before we can give you medicine for ADHD, we may refer you to get tested for it or confirm it another way.  We may not be able to give you an emotional support animal letter.  We don't do mental health checks ordered by the court.   We don't do mental health testing, but we can refer you to get tested.   Thank you for choosing us for your care.  For " informational purposes only. Not to replace the advice of your health care provider. Copyright   2022 [facility]. All rights reserved. [employer] [place] [id number] - 12/22.

## 2025-06-21 ENCOUNTER — HEALTH MAINTENANCE LETTER (OUTPATIENT)
Age: 25
End: 2025-06-21